# Patient Record
Sex: FEMALE | Race: WHITE | NOT HISPANIC OR LATINO | Employment: OTHER | URBAN - METROPOLITAN AREA
[De-identification: names, ages, dates, MRNs, and addresses within clinical notes are randomized per-mention and may not be internally consistent; named-entity substitution may affect disease eponyms.]

---

## 2023-04-12 LAB
ALANINE AMINOTRANSFERASE (SGPT) (U/L) IN SER/PLAS: 20 U/L (ref 7–45)
ALBUMIN (G/DL) IN SER/PLAS: 4.4 G/DL (ref 3.4–5)
ALKALINE PHOSPHATASE (U/L) IN SER/PLAS: 78 U/L (ref 33–136)
ANION GAP IN SER/PLAS: 11 MMOL/L (ref 10–20)
ASPARTATE AMINOTRANSFERASE (SGOT) (U/L) IN SER/PLAS: 22 U/L (ref 9–39)
BASOPHILS (10*3/UL) IN BLOOD BY AUTOMATED COUNT: 0.08 X10E9/L (ref 0–0.1)
BASOPHILS/100 LEUKOCYTES IN BLOOD BY AUTOMATED COUNT: 1.6 % (ref 0–2)
BILIRUBIN TOTAL (MG/DL) IN SER/PLAS: 0.6 MG/DL (ref 0–1.2)
CALCIDIOL (25 OH VITAMIN D3) (NG/ML) IN SER/PLAS: 44 NG/ML
CALCIUM (MG/DL) IN SER/PLAS: 10 MG/DL (ref 8.6–10.6)
CARBON DIOXIDE, TOTAL (MMOL/L) IN SER/PLAS: 32 MMOL/L (ref 21–32)
CHLORIDE (MMOL/L) IN SER/PLAS: 103 MMOL/L (ref 98–107)
CHOLESTEROL (MG/DL) IN SER/PLAS: 156 MG/DL (ref 0–199)
CHOLESTEROL IN HDL (MG/DL) IN SER/PLAS: 76.7 MG/DL
CHOLESTEROL/HDL RATIO: 2
CREATININE (MG/DL) IN SER/PLAS: 0.8 MG/DL (ref 0.5–1.05)
EOSINOPHILS (10*3/UL) IN BLOOD BY AUTOMATED COUNT: 0.16 X10E9/L (ref 0–0.7)
EOSINOPHILS/100 LEUKOCYTES IN BLOOD BY AUTOMATED COUNT: 3.3 % (ref 0–6)
ERYTHROCYTE DISTRIBUTION WIDTH (RATIO) BY AUTOMATED COUNT: 12.2 % (ref 11.5–14.5)
ERYTHROCYTE MEAN CORPUSCULAR HEMOGLOBIN CONCENTRATION (G/DL) BY AUTOMATED: 34.5 G/DL (ref 32–36)
ERYTHROCYTE MEAN CORPUSCULAR VOLUME (FL) BY AUTOMATED COUNT: 91 FL (ref 80–100)
ERYTHROCYTES (10*6/UL) IN BLOOD BY AUTOMATED COUNT: 4.31 X10E12/L (ref 4–5.2)
ESTIMATED AVERAGE GLUCOSE FOR HBA1C: 114 MG/DL
GFR FEMALE: 79 ML/MIN/1.73M2
GLUCOSE (MG/DL) IN SER/PLAS: 114 MG/DL (ref 74–99)
HEMATOCRIT (%) IN BLOOD BY AUTOMATED COUNT: 39.1 % (ref 36–46)
HEMOGLOBIN (G/DL) IN BLOOD: 13.5 G/DL (ref 12–16)
HEMOGLOBIN A1C/HEMOGLOBIN TOTAL IN BLOOD: 5.6 %
IMMATURE GRANULOCYTES/100 LEUKOCYTES IN BLOOD BY AUTOMATED COUNT: 0.2 % (ref 0–0.9)
LDL: 63 MG/DL (ref 0–99)
LEUKOCYTES (10*3/UL) IN BLOOD BY AUTOMATED COUNT: 4.9 X10E9/L (ref 4.4–11.3)
LYMPHOCYTES (10*3/UL) IN BLOOD BY AUTOMATED COUNT: 1.43 X10E9/L (ref 1.2–4.8)
LYMPHOCYTES/100 LEUKOCYTES IN BLOOD BY AUTOMATED COUNT: 29.3 % (ref 13–44)
MONOCYTES (10*3/UL) IN BLOOD BY AUTOMATED COUNT: 0.35 X10E9/L (ref 0.1–1)
MONOCYTES/100 LEUKOCYTES IN BLOOD BY AUTOMATED COUNT: 7.2 % (ref 2–10)
NEUTROPHILS (10*3/UL) IN BLOOD BY AUTOMATED COUNT: 2.85 X10E9/L (ref 1.2–7.7)
NEUTROPHILS/100 LEUKOCYTES IN BLOOD BY AUTOMATED COUNT: 58.4 % (ref 40–80)
NRBC (PER 100 WBCS) BY AUTOMATED COUNT: 0 /100 WBC (ref 0–0)
PLATELETS (10*3/UL) IN BLOOD AUTOMATED COUNT: 344 X10E9/L (ref 150–450)
POTASSIUM (MMOL/L) IN SER/PLAS: 4.7 MMOL/L (ref 3.5–5.3)
PROTEIN TOTAL: 7.2 G/DL (ref 6.4–8.2)
SODIUM (MMOL/L) IN SER/PLAS: 141 MMOL/L (ref 136–145)
THYROTROPIN (MIU/L) IN SER/PLAS BY DETECTION LIMIT <= 0.05 MIU/L: 2.26 MIU/L (ref 0.44–3.98)
TRIGLYCERIDE (MG/DL) IN SER/PLAS: 81 MG/DL (ref 0–149)
UREA NITROGEN (MG/DL) IN SER/PLAS: 14 MG/DL (ref 6–23)
VLDL: 16 MG/DL (ref 0–40)

## 2023-09-18 PROBLEM — M54.50 LUMBAGO: Status: ACTIVE | Noted: 2023-09-18

## 2023-09-18 PROBLEM — M47.16 LUMBAR SPONDYLOSIS WITH MYELOPATHY: Status: ACTIVE | Noted: 2023-09-18

## 2023-09-18 PROBLEM — M19.042 ARTHRITIS OF LEFT HAND: Status: ACTIVE | Noted: 2023-09-18

## 2023-09-18 PROBLEM — E55.9 VITAMIN D DEFICIENCY: Status: ACTIVE | Noted: 2023-09-18

## 2023-09-18 PROBLEM — R05.3 CHRONIC COUGH: Status: ACTIVE | Noted: 2023-09-18

## 2023-09-18 PROBLEM — R19.4 CHANGE IN BOWEL HABITS: Status: ACTIVE | Noted: 2023-09-18

## 2023-09-18 PROBLEM — R31.29 OTHER MICROSCOPIC HEMATURIA: Status: ACTIVE | Noted: 2023-09-18

## 2023-09-18 PROBLEM — J33.9 NASAL POLYPS: Status: ACTIVE | Noted: 2023-09-18

## 2023-09-18 PROBLEM — R53.83 FATIGUE: Status: ACTIVE | Noted: 2023-09-18

## 2023-09-18 PROBLEM — R51.9 RIGHT TEMPORAL HEADACHE: Status: ACTIVE | Noted: 2023-09-18

## 2023-09-18 PROBLEM — R79.89 ABNORMAL LFTS: Status: ACTIVE | Noted: 2023-09-18

## 2023-09-18 PROBLEM — R73.9 BORDERLINE HYPERGLYCEMIA: Status: ACTIVE | Noted: 2023-09-18

## 2023-09-18 PROBLEM — E66.3 OVERWEIGHT WITH BODY MASS INDEX (BMI) OF 27 TO 27.9 IN ADULT: Status: ACTIVE | Noted: 2023-09-18

## 2023-09-18 PROBLEM — M81.0 AGE RELATED OSTEOPOROSIS: Status: ACTIVE | Noted: 2023-09-18

## 2023-09-18 PROBLEM — M85.80 OSTEOPENIA: Status: ACTIVE | Noted: 2023-09-18

## 2023-09-18 PROBLEM — R42 DIZZINESS: Status: ACTIVE | Noted: 2023-09-18

## 2023-09-18 PROBLEM — M25.561 CHRONIC PAIN OF RIGHT KNEE: Status: ACTIVE | Noted: 2023-09-18

## 2023-09-18 PROBLEM — K21.9 GERD (GASTROESOPHAGEAL REFLUX DISEASE): Status: ACTIVE | Noted: 2023-09-18

## 2023-09-18 PROBLEM — D22.9 NUMEROUS MOLES: Status: ACTIVE | Noted: 2023-09-18

## 2023-09-18 PROBLEM — J30.9 ALLERGIC RHINITIS: Status: ACTIVE | Noted: 2023-09-18

## 2023-09-18 PROBLEM — M54.16 LUMBAR RADICULOPATHY: Status: ACTIVE | Noted: 2023-09-18

## 2023-09-18 PROBLEM — E83.52 HYPERCALCEMIA: Status: ACTIVE | Noted: 2023-09-18

## 2023-09-18 PROBLEM — E78.5 DYSLIPIDEMIA: Status: ACTIVE | Noted: 2023-09-18

## 2023-09-18 PROBLEM — M25.50 ARTHRALGIA OF MULTIPLE SITES: Status: ACTIVE | Noted: 2023-09-18

## 2023-09-18 PROBLEM — R10.31 ABDOMINAL WALL PAIN IN RIGHT LOWER QUADRANT: Status: ACTIVE | Noted: 2023-09-18

## 2023-09-18 PROBLEM — K25.9 GASTRIC ULCER WITHOUT HEMORRHAGE OR PERFORATION: Status: ACTIVE | Noted: 2023-09-18

## 2023-09-18 PROBLEM — M47.817 ARTHRITIS OF LUMBOSACRAL SPINE: Status: ACTIVE | Noted: 2023-09-18

## 2023-09-18 PROBLEM — N32.81 OVERACTIVE BLADDER: Status: ACTIVE | Noted: 2023-09-18

## 2023-09-18 PROBLEM — G89.29 CHRONIC PAIN OF RIGHT KNEE: Status: ACTIVE | Noted: 2023-09-18

## 2023-09-18 PROBLEM — S09.90XA INJURY OF HEAD: Status: ACTIVE | Noted: 2023-09-18

## 2023-09-18 PROBLEM — K76.0 FATTY LIVER: Status: ACTIVE | Noted: 2023-09-18

## 2023-09-18 PROBLEM — R31.9 HEMATURIA: Status: ACTIVE | Noted: 2023-09-18

## 2023-09-18 RX ORDER — LANOLIN ALCOHOL/MO/W.PET/CERES
2 CREAM (GRAM) TOPICAL DAILY
COMMUNITY
Start: 2020-02-21

## 2023-09-18 RX ORDER — CHOLECALCIFEROL (VITAMIN D3) 50 MCG
TABLET ORAL
COMMUNITY
Start: 2019-10-16

## 2023-09-18 RX ORDER — FAMOTIDINE 20 MG/1
1 TABLET, FILM COATED ORAL DAILY
COMMUNITY
Start: 2020-02-21 | End: 2023-10-09

## 2023-09-18 RX ORDER — ROSUVASTATIN CALCIUM 5 MG/1
1 TABLET, COATED ORAL 3 TIMES WEEKLY
COMMUNITY
Start: 2020-02-28 | End: 2023-12-20

## 2023-09-18 RX ORDER — BIOTIN 1 MG
1 TABLET ORAL DAILY
COMMUNITY
Start: 2021-02-24 | End: 2024-04-02

## 2023-09-18 RX ORDER — METFORMIN HYDROCHLORIDE 500 MG/1
1 TABLET, EXTENDED RELEASE ORAL
COMMUNITY
Start: 2018-05-11 | End: 2023-12-20

## 2023-10-08 DIAGNOSIS — K21.9 GASTROESOPHAGEAL REFLUX DISEASE WITHOUT ESOPHAGITIS: Primary | ICD-10-CM

## 2023-10-09 RX ORDER — FAMOTIDINE 20 MG/1
TABLET, FILM COATED ORAL
Qty: 90 TABLET | Refills: 1 | Status: SHIPPED | OUTPATIENT
Start: 2023-10-09 | End: 2024-04-02 | Stop reason: SDUPTHER

## 2023-10-12 ENCOUNTER — OFFICE VISIT (OUTPATIENT)
Dept: PRIMARY CARE | Facility: CLINIC | Age: 68
End: 2023-10-12
Payer: MEDICARE

## 2023-10-12 ENCOUNTER — LAB (OUTPATIENT)
Dept: LAB | Facility: LAB | Age: 68
End: 2023-10-12
Payer: MEDICARE

## 2023-10-12 ENCOUNTER — APPOINTMENT (OUTPATIENT)
Dept: PRIMARY CARE | Facility: CLINIC | Age: 68
End: 2023-10-12
Payer: MEDICARE

## 2023-10-12 VITALS
DIASTOLIC BLOOD PRESSURE: 80 MMHG | SYSTOLIC BLOOD PRESSURE: 108 MMHG | RESPIRATION RATE: 16 BRPM | BODY MASS INDEX: 27.3 KG/M2 | HEIGHT: 63 IN | OXYGEN SATURATION: 96 % | HEART RATE: 61 BPM | WEIGHT: 154.1 LBS

## 2023-10-12 DIAGNOSIS — E78.5 DYSLIPIDEMIA: Primary | ICD-10-CM

## 2023-10-12 DIAGNOSIS — M25.532 WRIST PAIN, CHRONIC, LEFT: ICD-10-CM

## 2023-10-12 DIAGNOSIS — K21.00 GASTROESOPHAGEAL REFLUX DISEASE WITH ESOPHAGITIS WITHOUT HEMORRHAGE: ICD-10-CM

## 2023-10-12 DIAGNOSIS — R73.9 BORDERLINE HYPERGLYCEMIA: ICD-10-CM

## 2023-10-12 DIAGNOSIS — E55.9 VITAMIN D DEFICIENCY: ICD-10-CM

## 2023-10-12 DIAGNOSIS — G89.29 WRIST PAIN, CHRONIC, LEFT: ICD-10-CM

## 2023-10-12 DIAGNOSIS — E78.5 DYSLIPIDEMIA: ICD-10-CM

## 2023-10-12 LAB
CHOLEST SERPL-MCNC: 157 MG/DL (ref 0–199)
CHOLESTEROL/HDL RATIO: 2.2
GLUCOSE P FAST SERPL-MCNC: 128 MG/DL (ref 74–99)
HDLC SERPL-MCNC: 71.2 MG/DL
LDLC SERPL CALC-MCNC: 72 MG/DL
NON HDL CHOLESTEROL: 86 MG/DL (ref 0–149)
TRIGL SERPL-MCNC: 71 MG/DL (ref 0–149)
VLDL: 14 MG/DL (ref 0–40)

## 2023-10-12 PROCEDURE — 82947 ASSAY GLUCOSE BLOOD QUANT: CPT

## 2023-10-12 PROCEDURE — 80061 LIPID PANEL: CPT

## 2023-10-12 PROCEDURE — 36415 COLL VENOUS BLD VENIPUNCTURE: CPT

## 2023-10-12 PROCEDURE — 99214 OFFICE O/P EST MOD 30 MIN: CPT | Performed by: INTERNAL MEDICINE

## 2023-10-12 PROCEDURE — 1159F MED LIST DOCD IN RCRD: CPT | Performed by: INTERNAL MEDICINE

## 2023-10-12 PROCEDURE — 1036F TOBACCO NON-USER: CPT | Performed by: INTERNAL MEDICINE

## 2023-10-12 ASSESSMENT — PATIENT HEALTH QUESTIONNAIRE - PHQ9
SUM OF ALL RESPONSES TO PHQ9 QUESTIONS 1 AND 2: 0
2. FEELING DOWN, DEPRESSED OR HOPELESS: NOT AT ALL
1. LITTLE INTEREST OR PLEASURE IN DOING THINGS: NOT AT ALL

## 2023-10-12 ASSESSMENT — COLUMBIA-SUICIDE SEVERITY RATING SCALE - C-SSRS
2. HAVE YOU ACTUALLY HAD ANY THOUGHTS OF KILLING YOURSELF?: NO
6. HAVE YOU EVER DONE ANYTHING, STARTED TO DO ANYTHING, OR PREPARED TO DO ANYTHING TO END YOUR LIFE?: NO
1. IN THE PAST MONTH, HAVE YOU WISHED YOU WERE DEAD OR WISHED YOU COULD GO TO SLEEP AND NOT WAKE UP?: NO

## 2023-10-12 ASSESSMENT — ENCOUNTER SYMPTOMS
HEADACHES: 0
OCCASIONAL FEELINGS OF UNSTEADINESS: 0
SHORTNESS OF BREATH: 0
ABDOMINAL PAIN: 0
LOSS OF SENSATION IN FEET: 0
DEPRESSION: 0

## 2023-10-12 NOTE — PATIENT INSTRUCTIONS
Reduce snacks at night, reduce weights and heavy lifting, continue brace for few weeks, apply  3 x day voltaren topical otc (diclofenac), ice periodically,  if not improved in 1 m, see hand ortho, return here in aprilease reduce snacks after dinner, use ligher weights and avoid heavy lifting. Return here for physical

## 2023-10-12 NOTE — PROGRESS NOTES
"Subjective   Patient ID: Janis Bailey is a 68 y.o. female who presents for  cholesterol . And borderline sugar    HPI She admits more relax diet during vacation, she eats some sweets after dinner. She was gong to gym with [employer], 4 x week, weights , cardio. However 6 days ago after exercise class and working in yard notice tender hand and wrist. She also felt in mid August with minor injury of same left wrist. She started since Friday wearing wrist, applied ice, and took oral 400 ibuprofen  qday  Dizzziness that she had before traveling in August has resolved.  Sleeps 5 to 6 hours and feels need more    Review of Systems   Respiratory:  Negative for shortness of breath.    Cardiovascular:  Negative for chest pain.   Gastrointestinal:  Negative for abdominal pain.   Neurological:  Negative for headaches.       Objective   /80 (BP Location: Left arm, Patient Position: Sitting, BP Cuff Size: Adult)   Pulse 61   Resp 16   Ht 1.6 m (5' 3\")   Wt 69.9 kg (154 lb 1.6 oz)   SpO2 96%   BMI 27.30 kg/m²     Physical Exam  Eyes - conjunctivae clear, PERRLA  HEENT - no impacted wax  Neck - no cervical lymphadenopathy, no thyromegaly  Axilla - no palpable lymphadenopathy  Cardiac- regular rate and rhythm, no murmurs, no carotid bruit, no JVP  Lung - clear to auscultation, no rales, no rhonchi, no wheezing  GI - normally active bowel sounds, non tender, non distended, no hepatosplenomegaly, no rebound  MSK - non deformities, edema on left thumb and 5 finger, tenderness to hyperextension of left wrist, no bruise, otherwise rom preserved  Extremities - no edema, good distal pulses  Neuro - non focal, oriented x 3  Skin - no bruises, no rashes  Psychiatric - pleasant, well groom, no hallucinations    Assessment/Plan   Problem List Items Addressed This Visit             ICD-10-CM       Cardiac and Vasculature    Dyslipidemia - Primary E78.5    Relevant Orders    Lipid Panel    Follow Up In Primary Care - " Health Maintenance       Endocrine/Metabolic    Borderline hyperglycemia R73.9    Relevant Orders    Glucose, fasting    Follow Up In Primary Care - Health Maintenance    Vitamin D deficiency E55.9     Discussed fasting hyperglycemia, diet modifications            Gastrointestinal and Abdominal    GERD (gastroesophageal reflux disease) K21.9    Relevant Orders    Follow Up In Primary Care - Health Maintenance       Musculoskeletal and Injuries    Wrist pain, chronic, left M25.532, G89.29

## 2023-10-13 ENCOUNTER — TELEPHONE (OUTPATIENT)
Dept: PRIMARY CARE | Facility: CLINIC | Age: 68
End: 2023-10-13
Payer: MEDICARE

## 2023-10-18 ENCOUNTER — APPOINTMENT (OUTPATIENT)
Dept: PRIMARY CARE | Facility: CLINIC | Age: 68
End: 2023-10-18
Payer: MEDICARE

## 2023-12-18 DIAGNOSIS — E78.5 DYSLIPIDEMIA: ICD-10-CM

## 2023-12-18 DIAGNOSIS — E11.9 DIABETES MELLITUS WITHOUT COMPLICATION (MULTI): ICD-10-CM

## 2023-12-20 RX ORDER — METFORMIN HYDROCHLORIDE 500 MG/1
500 TABLET, EXTENDED RELEASE ORAL
Qty: 180 TABLET | Refills: 1 | Status: SHIPPED | OUTPATIENT
Start: 2023-12-20 | End: 2024-03-18 | Stop reason: SDUPTHER

## 2023-12-20 RX ORDER — ROSUVASTATIN CALCIUM 5 MG/1
TABLET, COATED ORAL
Qty: 72 TABLET | Refills: 1 | Status: SHIPPED | OUTPATIENT
Start: 2023-12-20

## 2024-03-18 DIAGNOSIS — E11.9 DIABETES MELLITUS WITHOUT COMPLICATION (MULTI): ICD-10-CM

## 2024-03-18 RX ORDER — METFORMIN HYDROCHLORIDE 500 MG/1
500 TABLET, EXTENDED RELEASE ORAL
Qty: 180 TABLET | Refills: 1 | Status: SHIPPED | OUTPATIENT
Start: 2024-03-18

## 2024-04-02 ENCOUNTER — OFFICE VISIT (OUTPATIENT)
Dept: PRIMARY CARE | Facility: CLINIC | Age: 69
End: 2024-04-02
Payer: MEDICARE

## 2024-04-02 ENCOUNTER — LAB (OUTPATIENT)
Dept: LAB | Facility: LAB | Age: 69
End: 2024-04-02
Payer: MEDICARE

## 2024-04-02 VITALS
SYSTOLIC BLOOD PRESSURE: 118 MMHG | WEIGHT: 153.6 LBS | BODY MASS INDEX: 28.26 KG/M2 | DIASTOLIC BLOOD PRESSURE: 70 MMHG | RESPIRATION RATE: 15 BRPM | HEART RATE: 67 BPM | HEIGHT: 62 IN | OXYGEN SATURATION: 99 % | TEMPERATURE: 97.5 F

## 2024-04-02 DIAGNOSIS — E55.9 VITAMIN D DEFICIENCY, UNSPECIFIED: ICD-10-CM

## 2024-04-02 DIAGNOSIS — E78.5 DYSLIPIDEMIA: ICD-10-CM

## 2024-04-02 DIAGNOSIS — N32.81 OVERACTIVE BLADDER: ICD-10-CM

## 2024-04-02 DIAGNOSIS — Z86.39 HISTORY OF HYPERGLYCEMIA: ICD-10-CM

## 2024-04-02 DIAGNOSIS — R73.9 BORDERLINE HYPERGLYCEMIA: ICD-10-CM

## 2024-04-02 DIAGNOSIS — K21.9 GASTROESOPHAGEAL REFLUX DISEASE WITHOUT ESOPHAGITIS: ICD-10-CM

## 2024-04-02 DIAGNOSIS — M81.0 AGE RELATED OSTEOPOROSIS, UNSPECIFIED PATHOLOGICAL FRACTURE PRESENCE: ICD-10-CM

## 2024-04-02 DIAGNOSIS — Z00.00 PHYSICAL EXAM, ANNUAL: ICD-10-CM

## 2024-04-02 DIAGNOSIS — Z12.31 SCREENING MAMMOGRAM FOR BREAST CANCER: ICD-10-CM

## 2024-04-02 DIAGNOSIS — Z00.00 ROUTINE GENERAL MEDICAL EXAMINATION AT HEALTH CARE FACILITY: ICD-10-CM

## 2024-04-02 DIAGNOSIS — Z00.00 WELLNESS EXAMINATION: Primary | ICD-10-CM

## 2024-04-02 PROBLEM — R10.31 ABDOMINAL WALL PAIN IN RIGHT LOWER QUADRANT: Status: RESOLVED | Noted: 2023-09-18 | Resolved: 2024-04-02

## 2024-04-02 PROBLEM — M47.16 LUMBAR SPONDYLOSIS WITH MYELOPATHY: Status: RESOLVED | Noted: 2023-09-18 | Resolved: 2024-04-02

## 2024-04-02 PROBLEM — S09.90XA INJURY OF HEAD: Status: RESOLVED | Noted: 2023-09-18 | Resolved: 2024-04-02

## 2024-04-02 PROBLEM — M54.16 LUMBAR RADICULOPATHY: Status: RESOLVED | Noted: 2023-09-18 | Resolved: 2024-04-02

## 2024-04-02 PROBLEM — R19.4 CHANGE IN BOWEL HABITS: Status: RESOLVED | Noted: 2023-09-18 | Resolved: 2024-04-02

## 2024-04-02 LAB
25(OH)D3 SERPL-MCNC: 47 NG/ML (ref 30–100)
ALBUMIN SERPL BCP-MCNC: 4.3 G/DL (ref 3.4–5)
ALP SERPL-CCNC: 82 U/L (ref 33–136)
ALT SERPL W P-5'-P-CCNC: 20 U/L (ref 7–45)
ANION GAP SERPL CALC-SCNC: 12 MMOL/L (ref 10–20)
AST SERPL W P-5'-P-CCNC: 21 U/L (ref 9–39)
BILIRUB SERPL-MCNC: 0.5 MG/DL (ref 0–1.2)
BUN SERPL-MCNC: 19 MG/DL (ref 6–23)
CALCIUM SERPL-MCNC: 9.8 MG/DL (ref 8.6–10.6)
CHLORIDE SERPL-SCNC: 103 MMOL/L (ref 98–107)
CHOLEST SERPL-MCNC: 171 MG/DL (ref 0–199)
CHOLESTEROL/HDL RATIO: 2.3
CO2 SERPL-SCNC: 28 MMOL/L (ref 21–32)
CREAT SERPL-MCNC: 0.78 MG/DL (ref 0.5–1.05)
EGFRCR SERPLBLD CKD-EPI 2021: 81 ML/MIN/1.73M*2
ERYTHROCYTE [DISTWIDTH] IN BLOOD BY AUTOMATED COUNT: 13.5 % (ref 11.5–14.5)
EST. AVERAGE GLUCOSE BLD GHB EST-MCNC: 120 MG/DL
GLUCOSE SERPL-MCNC: 122 MG/DL (ref 74–99)
HBA1C MFR BLD: 5.8 %
HCT VFR BLD AUTO: 37.4 % (ref 36–46)
HDLC SERPL-MCNC: 75.2 MG/DL
HGB BLD-MCNC: 11.6 G/DL (ref 12–16)
LDLC SERPL CALC-MCNC: 80 MG/DL
MCH RBC QN AUTO: 27.8 PG (ref 26–34)
MCHC RBC AUTO-ENTMCNC: 31 G/DL (ref 32–36)
MCV RBC AUTO: 90 FL (ref 80–100)
NON HDL CHOLESTEROL: 96 MG/DL (ref 0–149)
NRBC BLD-RTO: 0 /100 WBCS (ref 0–0)
PLATELET # BLD AUTO: 368 X10*3/UL (ref 150–450)
POTASSIUM SERPL-SCNC: 4.6 MMOL/L (ref 3.5–5.3)
PROT SERPL-MCNC: 7.2 G/DL (ref 6.4–8.2)
RBC # BLD AUTO: 4.18 X10*6/UL (ref 4–5.2)
SODIUM SERPL-SCNC: 138 MMOL/L (ref 136–145)
TRIGL SERPL-MCNC: 78 MG/DL (ref 0–149)
TSH SERPL-ACNC: 3.96 MIU/L (ref 0.44–3.98)
VLDL: 16 MG/DL (ref 0–40)
WBC # BLD AUTO: 4.1 X10*3/UL (ref 4.4–11.3)

## 2024-04-02 PROCEDURE — 1159F MED LIST DOCD IN RCRD: CPT | Performed by: INTERNAL MEDICINE

## 2024-04-02 PROCEDURE — 83036 HEMOGLOBIN GLYCOSYLATED A1C: CPT

## 2024-04-02 PROCEDURE — 85027 COMPLETE CBC AUTOMATED: CPT

## 2024-04-02 PROCEDURE — 99397 PER PM REEVAL EST PAT 65+ YR: CPT | Performed by: INTERNAL MEDICINE

## 2024-04-02 PROCEDURE — 80061 LIPID PANEL: CPT

## 2024-04-02 PROCEDURE — 1160F RVW MEDS BY RX/DR IN RCRD: CPT | Performed by: INTERNAL MEDICINE

## 2024-04-02 PROCEDURE — 84443 ASSAY THYROID STIM HORMONE: CPT

## 2024-04-02 PROCEDURE — 80053 COMPREHEN METABOLIC PANEL: CPT

## 2024-04-02 PROCEDURE — 36415 COLL VENOUS BLD VENIPUNCTURE: CPT

## 2024-04-02 PROCEDURE — 1170F FXNL STATUS ASSESSED: CPT | Performed by: INTERNAL MEDICINE

## 2024-04-02 PROCEDURE — 1036F TOBACCO NON-USER: CPT | Performed by: INTERNAL MEDICINE

## 2024-04-02 PROCEDURE — 82306 VITAMIN D 25 HYDROXY: CPT

## 2024-04-02 PROCEDURE — G0439 PPPS, SUBSEQ VISIT: HCPCS | Performed by: INTERNAL MEDICINE

## 2024-04-02 RX ORDER — FAMOTIDINE 20 MG/1
TABLET, FILM COATED ORAL
Qty: 90 TABLET | Refills: 1 | Status: SHIPPED | OUTPATIENT
Start: 2024-04-02

## 2024-04-02 ASSESSMENT — ACTIVITIES OF DAILY LIVING (ADL)
TAKING_MEDICATION: INDEPENDENT
MANAGING_FINANCES: INDEPENDENT
GROCERY_SHOPPING: INDEPENDENT
DOING_HOUSEWORK: INDEPENDENT
DRESSING: INDEPENDENT
BATHING: INDEPENDENT

## 2024-04-02 ASSESSMENT — ENCOUNTER SYMPTOMS
HEADACHES: 0
NERVOUS/ANXIOUS: 0
CHEST TIGHTNESS: 0
SHORTNESS OF BREATH: 0
ABDOMINAL PAIN: 0
OCCASIONAL FEELINGS OF UNSTEADINESS: 0
CONSTIPATION: 0
DEPRESSION: 0
LIGHT-HEADEDNESS: 0
TREMORS: 0
BLOOD IN STOOL: 0
LOSS OF SENSATION IN FEET: 0

## 2024-04-02 ASSESSMENT — PATIENT HEALTH QUESTIONNAIRE - PHQ9
1. LITTLE INTEREST OR PLEASURE IN DOING THINGS: NOT AT ALL
2. FEELING DOWN, DEPRESSED OR HOPELESS: NOT AT ALL
SUM OF ALL RESPONSES TO PHQ9 QUESTIONS 1 AND 2: 0

## 2024-04-02 NOTE — PATIENT INSTRUCTIONS
Reduce snacks , maintain exercise, consider RSV vaccine, continue influenza booster every fall. Complete mammogram also in October. See dermatologist. Continue eye and dental visits as well. Return in 4m

## 2024-04-02 NOTE — PROGRESS NOTES
"Subjective   Reason for Visit: Janis Bailey is an 69 y.o. female here for a Medicare Wellness visit.     Past Medical, Surgical, and Family History reviewed and updated in chart.Reviewed all medications by prescribing practitioner or clinical pharmacist (such as prescriptions, OTCs, herbal therapies and supplements) and documented in the medical record.    HPI    Patient Care Team:  Ana Esquivel MD as PCP - General  Ana Esquivel MD as PCP - [employer] PCP   She eats 3 meals, average servings of fresh produce 2 x day, any kind of protein, downfall evening snack savory nuts or sweets.   She continues doing exercise at [facility], 3 x week, with weight straingh, cardio , yoga, each 45 min  Heartburn control with meds.  Continue taking rosuvastatin 5 x sweek    Review of Systems   Respiratory:  Negative for chest tightness and shortness of breath.    Cardiovascular:  Negative for leg swelling.   Gastrointestinal:  Negative for abdominal pain, blood in stool and constipation.   Genitourinary:         Urinary urgency , but prefers to avoid meds.   Musculoskeletal:         Right knee ache that relieves after activity. Ocassionally left groin/hip pain after exercise   Neurological:  Negative for tremors, light-headedness and headaches.   Psychiatric/Behavioral:  The patient is not nervous/anxious.         Sleeps about 6 hours , gets up to void or let dog out, some times able to fall sleep again   All other systems reviewed and are negative.      Objective   Vitals:  /70 (BP Location: Left arm, Patient Position: Sitting, BP Cuff Size: Adult)   Pulse 67   Temp 36.4 °C (97.5 °F)   Resp 15   Ht 1.575 m (5' 2\")   Wt 69.7 kg (153 lb 9.6 oz)   SpO2 99%   BMI 28.09 kg/m²       Physical Exam  Eyes - conjunctiva clear, PERRLA  HEENT - no impacted wax  Neck - No cervical lymphadenopathy, no thyromegaly  Axilla - no palpable lymphadenopathy  Breast - visual exam: no asymmetry; palpation: " nontender, no palpable nodules, retractions or discharge bilaterally  Cardiac - regular rate and rhythm, no murmurs, no carotid bruit, no JVP  Lung- clear to auscultation, no rales, no rhonchi, no wheezing  GI- normally active bowel sounds, nontender, nondistended, no hepatosplenomegaly, no rebound  MSK - no deformities  Neuro - non focal, oriented x 3  Extremities - no edema, good distal arterial pulses  Skin - no rash, few moles,   Psychiatric - pleasant, cooperative, no hallucinations    Assessment/Plan   Problem List Items Addressed This Visit       Borderline hyperglycemia    Current Assessment & Plan     Continue metformin         Relevant Orders    Hemoglobin A1C    Dyslipidemia    Overview     Lipid pending         Relevant Orders    Comprehensive Metabolic Panel    Lipid Panel    GERD (gastroesophageal reflux disease)    Current Assessment & Plan     Stable on meds         Relevant Medications    famotidine (Pepcid) 20 mg tablet    Other Relevant Orders    CBC    Age related osteoporosis    Relevant Orders    TSH with reflex to Free T4 if abnormal    Overactive bladder    Current Assessment & Plan     Pt prefers no medical intervention at present time         Wellness examination - Primary    Current Assessment & Plan     Discussed diet, exercise, immunizations, and screening appropriate for their age.  Colonoscopy: 2021 next 2026  Dexa: April 2022 borderline osteopenia  Mammogram: oct 2023             Physical exam, annual     Other Visit Diagnoses       Screening mammogram for breast cancer        Relevant Orders    BI mammo bilateral screening tomosynthesis    Vitamin D deficiency, unspecified        Relevant Orders    Vitamin D 25-Hydroxy,Total (for eval of Vitamin D levels)    History of hyperglycemia        Relevant Orders    TSH with reflex to Free T4 if abnormal

## 2024-04-02 NOTE — ASSESSMENT & PLAN NOTE
Discussed diet, exercise, immunizations, and screening appropriate for their age.  Colonoscopy: 2021 next 2026  Dexa: April 2022 borderline osteopenia  Mammogram: oct 2023

## 2024-04-04 ENCOUNTER — TELEPHONE (OUTPATIENT)
Dept: PRIMARY CARE | Facility: CLINIC | Age: 69
End: 2024-04-04
Payer: MEDICARE

## 2024-04-04 NOTE — TELEPHONE ENCOUNTER
Spoke with pt and all concerns were addressed                     ----- Message from Marci Welch CMA sent at 4/4/2024  3:01 PM EDT -----    ----- Message -----  From: Tomy Sam MD  Sent: 4/4/2024  12:10 PM EDT  To: Marci Welch CMA    Call patient I looked her labs  Glucose remains a little bit high at 122 A1c is still low at 5.8 continue with low-carb diet gradual weight loss

## 2024-07-10 ENCOUNTER — LAB (OUTPATIENT)
Dept: LAB | Facility: LAB | Age: 69
End: 2024-07-10
Payer: MEDICARE

## 2024-07-10 ENCOUNTER — OFFICE VISIT (OUTPATIENT)
Dept: PRIMARY CARE | Facility: CLINIC | Age: 69
End: 2024-07-10
Payer: MEDICARE

## 2024-07-10 VITALS
HEIGHT: 62 IN | SYSTOLIC BLOOD PRESSURE: 135 MMHG | WEIGHT: 152.12 LBS | HEART RATE: 62 BPM | RESPIRATION RATE: 16 BRPM | OXYGEN SATURATION: 98 % | DIASTOLIC BLOOD PRESSURE: 78 MMHG | TEMPERATURE: 97.2 F | BODY MASS INDEX: 27.99 KG/M2

## 2024-07-10 DIAGNOSIS — R73.9 BORDERLINE HYPERGLYCEMIA: Primary | ICD-10-CM

## 2024-07-10 DIAGNOSIS — D64.9 ANEMIA, UNSPECIFIED TYPE: ICD-10-CM

## 2024-07-10 DIAGNOSIS — R73.9 BORDERLINE HYPERGLYCEMIA: ICD-10-CM

## 2024-07-10 DIAGNOSIS — E78.5 DYSLIPIDEMIA: ICD-10-CM

## 2024-07-10 DIAGNOSIS — R03.0 PREHYPERTENSION: ICD-10-CM

## 2024-07-10 LAB
ERYTHROCYTE [DISTWIDTH] IN BLOOD BY AUTOMATED COUNT: 12.9 % (ref 11.5–14.5)
GLUCOSE P FAST SERPL-MCNC: 117 MG/DL (ref 74–99)
HCT VFR BLD AUTO: 32.1 % (ref 36–46)
HGB BLD-MCNC: 10.4 G/DL (ref 12–16)
IRON SATN MFR SERPL: 13 % (ref 25–45)
IRON SERPL-MCNC: 62 UG/DL (ref 35–150)
MCH RBC QN AUTO: 27.9 PG (ref 26–34)
MCHC RBC AUTO-ENTMCNC: 32.4 G/DL (ref 32–36)
MCV RBC AUTO: 86 FL (ref 80–100)
NRBC BLD-RTO: 0 /100 WBCS (ref 0–0)
PLATELET # BLD AUTO: 359 X10*3/UL (ref 150–450)
RBC # BLD AUTO: 3.73 X10*6/UL (ref 4–5.2)
TIBC SERPL-MCNC: 470 UG/DL (ref 240–445)
UIBC SERPL-MCNC: 408 UG/DL (ref 110–370)
WBC # BLD AUTO: 4.8 X10*3/UL (ref 4.4–11.3)

## 2024-07-10 PROCEDURE — 1160F RVW MEDS BY RX/DR IN RCRD: CPT | Performed by: INTERNAL MEDICINE

## 2024-07-10 PROCEDURE — 83550 IRON BINDING TEST: CPT

## 2024-07-10 PROCEDURE — 85027 COMPLETE CBC AUTOMATED: CPT

## 2024-07-10 PROCEDURE — 36415 COLL VENOUS BLD VENIPUNCTURE: CPT

## 2024-07-10 PROCEDURE — 99214 OFFICE O/P EST MOD 30 MIN: CPT | Performed by: INTERNAL MEDICINE

## 2024-07-10 PROCEDURE — 1036F TOBACCO NON-USER: CPT | Performed by: INTERNAL MEDICINE

## 2024-07-10 PROCEDURE — 1159F MED LIST DOCD IN RCRD: CPT | Performed by: INTERNAL MEDICINE

## 2024-07-10 PROCEDURE — 83540 ASSAY OF IRON: CPT

## 2024-07-10 PROCEDURE — 82947 ASSAY GLUCOSE BLOOD QUANT: CPT

## 2024-07-10 RX ORDER — ROSUVASTATIN CALCIUM 5 MG/1
TABLET, COATED ORAL
Qty: 72 TABLET | Refills: 1 | Status: SHIPPED | OUTPATIENT
Start: 2024-07-10

## 2024-07-10 ASSESSMENT — COLUMBIA-SUICIDE SEVERITY RATING SCALE - C-SSRS
1. IN THE PAST MONTH, HAVE YOU WISHED YOU WERE DEAD OR WISHED YOU COULD GO TO SLEEP AND NOT WAKE UP?: NO
2. HAVE YOU ACTUALLY HAD ANY THOUGHTS OF KILLING YOURSELF?: NO
6. HAVE YOU EVER DONE ANYTHING, STARTED TO DO ANYTHING, OR PREPARED TO DO ANYTHING TO END YOUR LIFE?: NO

## 2024-07-10 ASSESSMENT — ENCOUNTER SYMPTOMS
OCCASIONAL FEELINGS OF UNSTEADINESS: 0
DEPRESSION: 0
LOSS OF SENSATION IN FEET: 0

## 2024-07-10 NOTE — PATIENT INSTRUCTIONS
Decrease salty snacks, increase protein and vegetables in your dinner, avoid eating after 7:30 pm, continue current medications, with blood work will advice further about iron and donations. In the fall no at the same time complete influenza and RSV vaccines  Return in 4 months.   spontaneous

## 2024-07-10 NOTE — ASSESSMENT & PLAN NOTE
Due to frequent donations, w [employer] will advice if appropiate iron and frequency of donations

## 2024-07-10 NOTE — PROGRESS NOTES
"Subjective   Patient ID: Janis Bailey is a 69 y.o. female who presents for Hyperlipidemia.    HPI She continues doing  yoga, swimming, cardio/weights and ocassionally walks her dog. Last few weeks craving for salty snacks. Snacks some times after dinner if very early.   She donates blood about 3 x year, she donated in June and experienced important fatigue for several weeks in that month. She takes iron otc for couple days prior to donation.    Review of Systems   Constitutional:         Ocassional early afternoon tiredness and ocassionally naps.   Gastrointestinal:         Intermitent abdominal pain that subside after bowel movement. Heartburn asymptomatic.        Objective   /78   Pulse 62   Temp 36.2 °C (97.2 °F)   Resp 16   Ht 1.575 m (5' 2\")   Wt 69 kg (152 lb 1.9 oz)   SpO2 98%   BMI 27.82 kg/m²     Physical Exam    Assessment/Plan   Problem List Items Addressed This Visit             ICD-10-CM    Borderline hyperglycemia - Primary R73.9     Discussed her diet and maintenance of exercise.         Relevant Orders    Glucose, fasting    Dyslipidemia E78.5     Continue same dose, refills done         Relevant Medications    rosuvastatin (Crestor) 5 mg tablet    Anemia D64.9     Due to frequent donations, w new labs will advice if appropiate iron and frequency of donations         Relevant Orders    CBC    Iron and TIBC    Prehypertension R03.0     Discussed salt intake, hydration, maintain exercise. Ocassional bp monitor outside of office. Return in 4 m to recheck               "

## 2024-07-10 NOTE — ASSESSMENT & PLAN NOTE
Discussed salt intake, hydration, maintain exercise. Ocassional bp monitor outside of office. Return in 4 m to recheck

## 2024-07-16 DIAGNOSIS — D50.8 OTHER IRON DEFICIENCY ANEMIA: Primary | ICD-10-CM

## 2024-07-16 RX ORDER — FERROUS GLUCONATE 256(28)MG
TABLET ORAL
Qty: 30 TABLET | Refills: 3 | Status: SHIPPED | OUTPATIENT
Start: 2024-07-16

## 2024-08-13 ENCOUNTER — APPOINTMENT (OUTPATIENT)
Dept: PRIMARY CARE | Facility: CLINIC | Age: 69
End: 2024-08-13
Payer: MEDICARE

## 2024-08-16 ENCOUNTER — APPOINTMENT (OUTPATIENT)
Dept: PRIMARY CARE | Facility: CLINIC | Age: 69
End: 2024-08-16
Payer: MEDICARE

## 2024-09-10 DIAGNOSIS — E11.9 DIABETES MELLITUS WITHOUT COMPLICATION (MULTI): ICD-10-CM

## 2024-09-11 RX ORDER — METFORMIN HYDROCHLORIDE 500 MG/1
500 TABLET, EXTENDED RELEASE ORAL
Qty: 180 TABLET | Refills: 1 | Status: SHIPPED | OUTPATIENT
Start: 2024-09-11

## 2024-10-18 DIAGNOSIS — K21.9 GASTROESOPHAGEAL REFLUX DISEASE WITHOUT ESOPHAGITIS: ICD-10-CM

## 2024-10-18 RX ORDER — FAMOTIDINE 20 MG/1
TABLET, FILM COATED ORAL
Qty: 90 TABLET | Refills: 1 | Status: SHIPPED | OUTPATIENT
Start: 2024-10-18

## 2024-11-05 ENCOUNTER — APPOINTMENT (OUTPATIENT)
Dept: PRIMARY CARE | Facility: CLINIC | Age: 69
End: 2024-11-05
Payer: MEDICARE

## 2024-11-21 ENCOUNTER — APPOINTMENT (OUTPATIENT)
Dept: PRIMARY CARE | Facility: CLINIC | Age: 69
End: 2024-11-21
Payer: MEDICARE

## 2024-11-21 ENCOUNTER — LAB (OUTPATIENT)
Dept: LAB | Facility: LAB | Age: 69
End: 2024-11-21
Payer: MEDICARE

## 2024-11-21 VITALS
DIASTOLIC BLOOD PRESSURE: 71 MMHG | SYSTOLIC BLOOD PRESSURE: 135 MMHG | WEIGHT: 152 LBS | BODY MASS INDEX: 27.8 KG/M2 | OXYGEN SATURATION: 96 % | HEART RATE: 62 BPM

## 2024-11-21 DIAGNOSIS — E78.5 DYSLIPIDEMIA: ICD-10-CM

## 2024-11-21 DIAGNOSIS — D64.9 ANEMIA, UNSPECIFIED TYPE: ICD-10-CM

## 2024-11-21 DIAGNOSIS — D64.9 ANEMIA, UNSPECIFIED TYPE: Primary | ICD-10-CM

## 2024-11-21 DIAGNOSIS — R73.9 BORDERLINE HYPERGLYCEMIA: ICD-10-CM

## 2024-11-21 DIAGNOSIS — R03.0 ELEVATED BLOOD PRESSURE READING: ICD-10-CM

## 2024-11-21 DIAGNOSIS — R25.2 LEG CRAMP: ICD-10-CM

## 2024-11-21 LAB
ERYTHROCYTE [DISTWIDTH] IN BLOOD BY AUTOMATED COUNT: 12.7 % (ref 11.5–14.5)
GLUCOSE P FAST SERPL-MCNC: 110 MG/DL (ref 74–99)
HCT VFR BLD AUTO: 39 % (ref 36–46)
HGB BLD-MCNC: 13.4 G/DL (ref 12–16)
IRON SATN MFR SERPL: 32 % (ref 25–45)
IRON SERPL-MCNC: 127 UG/DL (ref 35–150)
MAGNESIUM SERPL-MCNC: 2.01 MG/DL (ref 1.6–2.4)
MCH RBC QN AUTO: 31.3 PG (ref 26–34)
MCHC RBC AUTO-ENTMCNC: 34.4 G/DL (ref 32–36)
MCV RBC AUTO: 91 FL (ref 80–100)
NRBC BLD-RTO: 0 /100 WBCS (ref 0–0)
PLATELET # BLD AUTO: 378 X10*3/UL (ref 150–450)
RBC # BLD AUTO: 4.28 X10*6/UL (ref 4–5.2)
TIBC SERPL-MCNC: 393 UG/DL (ref 240–445)
UIBC SERPL-MCNC: 266 UG/DL (ref 110–370)
VIT B12 SERPL-MCNC: >2000 PG/ML (ref 211–911)
WBC # BLD AUTO: 5.4 X10*3/UL (ref 4.4–11.3)

## 2024-11-21 PROCEDURE — 83735 ASSAY OF MAGNESIUM: CPT

## 2024-11-21 PROCEDURE — 1158F ADVNC CARE PLAN TLK DOCD: CPT | Performed by: INTERNAL MEDICINE

## 2024-11-21 PROCEDURE — 82947 ASSAY GLUCOSE BLOOD QUANT: CPT

## 2024-11-21 PROCEDURE — 82607 VITAMIN B-12: CPT

## 2024-11-21 PROCEDURE — 1036F TOBACCO NON-USER: CPT | Performed by: INTERNAL MEDICINE

## 2024-11-21 PROCEDURE — 1159F MED LIST DOCD IN RCRD: CPT | Performed by: INTERNAL MEDICINE

## 2024-11-21 PROCEDURE — 36415 COLL VENOUS BLD VENIPUNCTURE: CPT

## 2024-11-21 PROCEDURE — 83550 IRON BINDING TEST: CPT

## 2024-11-21 PROCEDURE — 85027 COMPLETE CBC AUTOMATED: CPT

## 2024-11-21 PROCEDURE — 1123F ACP DISCUSS/DSCN MKR DOCD: CPT | Performed by: INTERNAL MEDICINE

## 2024-11-21 PROCEDURE — 83540 ASSAY OF IRON: CPT

## 2024-11-21 PROCEDURE — 99214 OFFICE O/P EST MOD 30 MIN: CPT | Performed by: INTERNAL MEDICINE

## 2024-11-21 PROCEDURE — G2211 COMPLEX E/M VISIT ADD ON: HCPCS | Performed by: INTERNAL MEDICINE

## 2024-11-21 ASSESSMENT — ENCOUNTER SYMPTOMS
LOSS OF SENSATION IN FEET: 0
DEPRESSION: 0
FATIGUE: 0
OCCASIONAL FEELINGS OF UNSTEADINESS: 0
PALPITATIONS: 0

## 2024-11-21 NOTE — PROGRESS NOTES
Subjective   Patient ID: Janis Bailey is a 69 y.o. female who presents for Follow-up.bp, anemia,     HPI Her salt intake is moderate to high, her intake of calories are stable, she continues exercise , today her initial bp was 170 but she was running errands, prior reading have shawna mild elevated.  She started in July OTC iron, after labs in our office and she has not donated any more. She feels less tired.     Review of Systems   Constitutional:  Negative for fatigue.   Cardiovascular:  Negative for palpitations and leg swelling.   Musculoskeletal:         Bilateral knee aches.leg cramps at night sporadically.   Neurological:         Weather related sinus headaches   All other systems reviewed and are negative.      Objective   /84 (BP Location: Right arm, Patient Position: Sitting)   Pulse 62   Wt 68.9 kg (152 lb)   SpO2 96%   BMI 27.80 kg/m²     Physical Exam  Eyes - conjunctivae clear, PERRLA  HEENT - no impacted wax  Neck - no cervical lymphadenopathy, no thyromegaly  Axilla - no palpable lymphadenopathy  Cardiac- regular rate and rhythm, no murmurs, no carotid bruit, no JVP  Lung - clear to auscultation, no rales, no rhonchi, no wheezing  GI - normally active bowel sounds, non tender, non distended, no hepatosplenomegaly, no rebound  MSK - non deformities  Extremities - no edema, good distal pulses  Neuro - non focal, oriented x 3  Skin - no bruises, no rashes  Psychiatric - pleasant, well groom, no hallucinations    Assessment/Plan   Problem List Items Addressed This Visit             ICD-10-CM    Borderline hyperglycemia R73.9     Continue metformin         Relevant Orders    Glucose, fasting    Dyslipidemia E78.5     On goal         Anemia - Primary D64.9     Pending new labs. Pt clinically improved         Relevant Orders    CBC    Iron and TIBC    Vitamin B12    Elevated blood pressure reading R03.0     Discussed non medical interventions for bp reduction , home monitor, re eval in 6 to 8  w         Leg cramp R25.2     Discussed streching, hydration , we may add magnsium if labs ok         Relevant Orders    Magnesium

## 2024-11-21 NOTE — PATIENT INSTRUCTIONS
Keep low salt intake, maintain your weight , obtain your own blood pressure machine and monitor about 4 x week, call office if top number 140 or bottom 90 frequently. With labs will give you more instructions. Advised to complete covid.

## 2024-11-25 ENCOUNTER — OFFICE VISIT (OUTPATIENT)
Dept: URGENT CARE | Age: 69
End: 2024-11-25
Payer: MEDICARE

## 2024-11-25 VITALS
WEIGHT: 151 LBS | DIASTOLIC BLOOD PRESSURE: 74 MMHG | OXYGEN SATURATION: 98 % | TEMPERATURE: 97.6 F | HEIGHT: 63 IN | SYSTOLIC BLOOD PRESSURE: 140 MMHG | BODY MASS INDEX: 26.75 KG/M2 | RESPIRATION RATE: 22 BRPM | HEART RATE: 60 BPM

## 2024-11-25 DIAGNOSIS — R30.0 DYSURIA: Primary | ICD-10-CM

## 2024-11-25 LAB
POC APPEARANCE, URINE: ABNORMAL
POC BILIRUBIN, URINE: NEGATIVE
POC BLOOD, URINE: ABNORMAL
POC COLOR, URINE: ABNORMAL
POC GLUCOSE, URINE: NEGATIVE MG/DL
POC KETONES, URINE: NEGATIVE MG/DL
POC LEUKOCYTES, URINE: ABNORMAL
POC NITRITE,URINE: NEGATIVE
POC PH, URINE: 5.5 PH
POC PROTEIN, URINE: NEGATIVE MG/DL
POC SPECIFIC GRAVITY, URINE: 1.02
POC UROBILINOGEN, URINE: 0.2 EU/DL

## 2024-11-25 PROCEDURE — 81003 URINALYSIS AUTO W/O SCOPE: CPT | Performed by: NURSE PRACTITIONER

## 2024-11-25 PROCEDURE — 1123F ACP DISCUSS/DSCN MKR DOCD: CPT | Performed by: NURSE PRACTITIONER

## 2024-11-25 PROCEDURE — 3008F BODY MASS INDEX DOCD: CPT | Performed by: NURSE PRACTITIONER

## 2024-11-25 PROCEDURE — 99213 OFFICE O/P EST LOW 20 MIN: CPT | Performed by: NURSE PRACTITIONER

## 2024-11-25 PROCEDURE — 1036F TOBACCO NON-USER: CPT | Performed by: NURSE PRACTITIONER

## 2024-11-25 PROCEDURE — 1159F MED LIST DOCD IN RCRD: CPT | Performed by: NURSE PRACTITIONER

## 2024-11-25 RX ORDER — NITROFURANTOIN 25; 75 MG/1; MG/1
100 CAPSULE ORAL 2 TIMES DAILY
Qty: 10 CAPSULE | Refills: 0 | Status: SHIPPED | OUTPATIENT
Start: 2024-11-25 | End: 2024-11-30

## 2024-11-25 NOTE — PATIENT INSTRUCTIONS
You are being treated for UTI. We are sending out a culture which will specifically tell us which antibiotic will work best against the bacteria in your urine.

## 2024-11-25 NOTE — PROGRESS NOTES
Chief Complaint   Patient presents with    UTI     Urine frequency and elevated blood pressure x 3 days.   C/o urinary frequency, back pain, and difficulty urinating     Physical Exam:     No CVA tenderness bilaterally     POC Labs:     Office Visit on 11/25/2024   Component Date Value Ref Range Status    POC Color, Urine 11/25/2024 Straw  Straw, Yellow, Light-Yellow Final    POC Appearance, Urine 11/25/2024 Hazy (A)  Clear Final    POC Glucose, Urine 11/25/2024 NEGATIVE  NEGATIVE mg/dl Final    POC Bilirubin, Urine 11/25/2024 NEGATIVE  NEGATIVE Final    POC Ketones, Urine 11/25/2024 NEGATIVE  NEGATIVE mg/dl Final    POC Specific Gravity, Urine 11/25/2024 1.025  1.005 - 1.035 Final    POC Blood, Urine 11/25/2024 TRACE-Intact (A)  NEGATIVE Final    POC PH, Urine 11/25/2024 5.5  No Reference Range Established PH Final    POC Protein, Urine 11/25/2024 NEGATIVE  NEGATIVE, 30 (1+) mg/dl Final    POC Urobilinogen, Urine 11/25/2024 0.2  0.2, 1.0 EU/DL Final    Poc Nitrite, Urine 11/25/2024 NEGATIVE  NEGATIVE Final    POC Leukocytes, Urine 11/25/2024 TRACE (A)  NEGATIVE Final        Encounter Diagnosis   Name Primary?    Dysuria Yes        Medical Decision Making & Plan:     Dysuria  -UA with trace leuks, not overtly convincing of UTI however she does endorse dysuria which just started. Will start macrobid and send culture.  Return to clinic if no improvement or worsening of symptoms after 7-10 days  -instructed to go to ED should she develop fever or flank pain       11/25/24 at 1:20 PM - TAMMY Reyes-CNP

## 2024-11-26 ENCOUNTER — TELEPHONE (OUTPATIENT)
Dept: PRIMARY CARE | Facility: CLINIC | Age: 69
End: 2024-11-26
Payer: MEDICARE

## 2025-01-15 ENCOUNTER — APPOINTMENT (OUTPATIENT)
Dept: PRIMARY CARE | Facility: CLINIC | Age: 70
End: 2025-01-15
Payer: MEDICARE

## 2025-01-15 VITALS
OXYGEN SATURATION: 98 % | BODY MASS INDEX: 26.39 KG/M2 | HEART RATE: 63 BPM | DIASTOLIC BLOOD PRESSURE: 72 MMHG | WEIGHT: 149 LBS | SYSTOLIC BLOOD PRESSURE: 146 MMHG

## 2025-01-15 DIAGNOSIS — R03.0 ELEVATED BLOOD PRESSURE READING: Primary | ICD-10-CM

## 2025-01-15 DIAGNOSIS — M70.31 BURSITIS OF RIGHT ELBOW, UNSPECIFIED BURSA: ICD-10-CM

## 2025-01-15 DIAGNOSIS — E78.5 DYSLIPIDEMIA: ICD-10-CM

## 2025-01-15 PROCEDURE — 1123F ACP DISCUSS/DSCN MKR DOCD: CPT | Performed by: INTERNAL MEDICINE

## 2025-01-15 PROCEDURE — 99213 OFFICE O/P EST LOW 20 MIN: CPT | Performed by: INTERNAL MEDICINE

## 2025-01-15 PROCEDURE — 1036F TOBACCO NON-USER: CPT | Performed by: INTERNAL MEDICINE

## 2025-01-15 PROCEDURE — 1160F RVW MEDS BY RX/DR IN RCRD: CPT | Performed by: INTERNAL MEDICINE

## 2025-01-15 PROCEDURE — G2211 COMPLEX E/M VISIT ADD ON: HCPCS | Performed by: INTERNAL MEDICINE

## 2025-01-15 PROCEDURE — 1159F MED LIST DOCD IN RCRD: CPT | Performed by: INTERNAL MEDICINE

## 2025-01-15 RX ORDER — ROSUVASTATIN CALCIUM 5 MG/1
TABLET, COATED ORAL
Qty: 72 TABLET | Refills: 1 | Status: SHIPPED | OUTPATIENT
Start: 2025-01-15

## 2025-01-15 ASSESSMENT — ENCOUNTER SYMPTOMS
OCCASIONAL FEELINGS OF UNSTEADINESS: 0
PALPITATIONS: 0
LOSS OF SENSATION IN FEET: 0
DEPRESSION: 0
HEADACHES: 0

## 2025-01-15 NOTE — PATIENT INSTRUCTIONS
Maintain low salt diet, keep regular exercise, limit amount of caffeine intake, apply ice and voltaren ( diclofenac cream otc ) avoid heavy lifting on that side of arm

## 2025-01-15 NOTE — PROGRESS NOTES
Subjective   Patient ID: Janis Bailey is a 69 y.o. female who presents for bp check    She brings bp readings after 5 min resting, sitting and systolics 140 to 118 , diastolics 66 to 82. She craves salt, intake is moderate, She was off exercise for 2 weeks, she regularly exercise between 3 to 4 x week with weight/ cardio.  She started magnesium in November and leg cramps resolved.  2 days ago she started feeling very tender elbow, this am slight redness, unable to bend fully. She has been using more that side due to prior left arm /neck pain that now resolved    Review of Systems   Cardiovascular:  Negative for palpitations and leg swelling.   Neurological:  Negative for headaches.   All other systems reviewed and are negative.      Objective   /72   Pulse 63   Wt 67.6 kg (149 lb)   LMP  (LMP Unknown)   SpO2 98%   BMI 26.39 kg/m²     Physical Exam  Eyes- Conjunctiva clear  Neck-no thyromegaly  Cardiac- regular rate and rhythm, no murmurs  Lung- clear to auscultation  GI- present  bowel sounds, nontender, no rebound  MSK- no deformities,right swollen elbow about 1 quarter size, with tenderness and erythema, able to maintain rom, excep extreme extension  Extremities- no edema, good distal pulses  Neuro- non focal, oriented x 3  Psychiatric- pleasant, well groomed, no hallucinations    Assessment/Plan   Problem List Items Addressed This Visit             ICD-10-CM    Dyslipidemia E78.5    Relevant Medications    rosuvastatin (Crestor) 5 mg tablet    Elevated blood pressure reading - Primary R03.0     No need for med, discussed non medical intervention , re eval in next visit.         Bursitis of right elbow M70.31     Topical treatment, F/U PRN

## 2025-01-21 ENCOUNTER — APPOINTMENT (OUTPATIENT)
Dept: PRIMARY CARE | Facility: CLINIC | Age: 70
End: 2025-01-21
Payer: MEDICARE

## 2025-02-19 ENCOUNTER — APPOINTMENT (OUTPATIENT)
Dept: PRIMARY CARE | Facility: CLINIC | Age: 70
End: 2025-02-19
Payer: MEDICARE

## 2025-03-26 DIAGNOSIS — E11.9 DIABETES MELLITUS WITHOUT COMPLICATION: ICD-10-CM

## 2025-03-27 RX ORDER — METFORMIN HYDROCHLORIDE 500 MG/1
500 TABLET, EXTENDED RELEASE ORAL
Qty: 180 TABLET | Refills: 0 | Status: SHIPPED | OUTPATIENT
Start: 2025-03-27

## 2025-04-07 ENCOUNTER — APPOINTMENT (OUTPATIENT)
Dept: ORTHOPEDIC SURGERY | Facility: CLINIC | Age: 70
End: 2025-04-07
Payer: MEDICARE

## 2025-04-07 ENCOUNTER — HOSPITAL ENCOUNTER (OUTPATIENT)
Dept: RADIOLOGY | Facility: CLINIC | Age: 70
Discharge: HOME | End: 2025-04-07
Payer: MEDICARE

## 2025-04-07 DIAGNOSIS — M65.342 TRIGGER FINGER, LEFT RING FINGER: Primary | ICD-10-CM

## 2025-04-07 DIAGNOSIS — M19.042 PRIMARY OSTEOARTHRITIS OF LEFT HAND: ICD-10-CM

## 2025-04-07 DIAGNOSIS — M79.642 LEFT HAND PAIN: ICD-10-CM

## 2025-04-07 PROCEDURE — 76942 ECHO GUIDE FOR BIOPSY: CPT | Performed by: ORTHOPAEDIC SURGERY

## 2025-04-07 PROCEDURE — 1159F MED LIST DOCD IN RCRD: CPT | Performed by: ORTHOPAEDIC SURGERY

## 2025-04-07 PROCEDURE — 1160F RVW MEDS BY RX/DR IN RCRD: CPT | Performed by: ORTHOPAEDIC SURGERY

## 2025-04-07 PROCEDURE — 99203 OFFICE O/P NEW LOW 30 MIN: CPT | Performed by: ORTHOPAEDIC SURGERY

## 2025-04-07 PROCEDURE — 1123F ACP DISCUSS/DSCN MKR DOCD: CPT | Performed by: ORTHOPAEDIC SURGERY

## 2025-04-07 PROCEDURE — 1125F AMNT PAIN NOTED PAIN PRSNT: CPT | Performed by: ORTHOPAEDIC SURGERY

## 2025-04-07 PROCEDURE — 1036F TOBACCO NON-USER: CPT | Performed by: ORTHOPAEDIC SURGERY

## 2025-04-07 PROCEDURE — 73130 X-RAY EXAM OF HAND: CPT | Mod: LEFT SIDE | Performed by: RADIOLOGY

## 2025-04-07 PROCEDURE — 73130 X-RAY EXAM OF HAND: CPT | Mod: LT

## 2025-04-07 PROCEDURE — 20550 NJX 1 TENDON SHEATH/LIGAMENT: CPT | Performed by: ORTHOPAEDIC SURGERY

## 2025-04-07 ASSESSMENT — ENCOUNTER SYMPTOMS
CHILLS: 0
JOINT SWELLING: 1
SHORTNESS OF BREATH: 0
COLOR CHANGE: 0
WHEEZING: 0
FATIGUE: 0
ARTHRALGIAS: 1
SINUS PAIN: 0
FEVER: 0
SORE THROAT: 0

## 2025-04-07 ASSESSMENT — PAIN SCALES - GENERAL: PAINLEVEL_OUTOF10: 8

## 2025-04-07 ASSESSMENT — PAIN - FUNCTIONAL ASSESSMENT: PAIN_FUNCTIONAL_ASSESSMENT: 0-10

## 2025-04-07 NOTE — PROGRESS NOTES
Reason for Appointment  Chief Complaint   Patient presents with    Left Hand - Pain     History of Present Illness  New patient is a 70 y.o. female here today for evaluation of her left ring finger.  For the last few months she has had increased pain and catching and locking of the left ring finger.  She has swelling over the left ring A1 pulley and a possible ganglion cyst.  X-rays taken today of left hand shows severe DJD throughout the hand but most severe at the left ring finger MP and PIP joints in the left thumb CMC joint.  She is still very active.     Past Medical History:   Diagnosis Date    Abdominal distension (gaseous) 07/19/2021    Bloated abdomen    Abnormal findings on diagnostic imaging of other specified body structures 03/21/2018    Increased endometrial stripe thickness    Age-related osteoporosis without current pathological fracture 04/13/2022    Age related osteoporosis    Anesthesia of skin 04/12/2016    Numbness of face    Chronic cough 08/10/2022    Post-COVID chronic cough    Encounter for gynecological examination (general) (routine) without abnormal findings     Encounter for gynecological examination without abnormal finding    Encounter for screening mammogram for malignant neoplasm of breast 05/31/2013    Visit for screening mammogram    Lateral epicondylitis, right elbow 06/16/2016    Lateral epicondylitis of both elbows    Low back pain, unspecified 05/18/2018    Acute low back pain due to trauma    Lumbar spondylosis with myelopathy 09/18/2023    Metabolic syndrome 09/20/2017    Insulin resistance syndrome    Other specified disorders of ear, unspecified ear 03/17/2015    Pressure sensation in ear    Other spondylosis with myelopathy, lumbar region 05/25/2021    Lumbar spondylosis with myelopathy    Pain in right ankle and joints of right foot 05/02/2017    Toe joint pain, right    Pain in unspecified hand 12/13/2013    Pain in hand    Paresthesia of skin 04/12/2016    Left leg  paresthesias    Paresthesia of skin 2016    Tingling    Personal history of other diseases of the digestive system 10/22/2015    History of esophageal reflux    Personal history of other diseases of the female genital tract 2020    History of vaginitis    Personal history of other diseases of the musculoskeletal system and connective tissue 2020    History of muscle pain    Personal history of other diseases of the musculoskeletal system and connective tissue 2019    History of low back pain    Personal history of other diseases of the nervous system and sense organs 2013    History of carpal tunnel syndrome    Personal history of other diseases of the respiratory system 2016    History of upper respiratory infection    Personal history of other diseases of the respiratory system 2015    History of acute sinusitis    Personal history of other diseases of the respiratory system 2013    History of acute bronchitis    Personal history of other specified conditions 2015    History of shortness of breath    Personal history of other specified conditions 2016    History of dizziness    Personal history of other specified conditions 2015    History of chest pain    Personal history of urinary (tract) infections 2020    History of urinary tract infection    Prediabetes 2015    Prediabetes    Right lower quadrant pain 2021    Abdominal wall pain in right lower quadrant    Right lower quadrant pain 2017    Right lower quadrant abdominal pain of unknown etiology    Right upper quadrant pain 2018    Right upper quadrant abdominal pain    Solitary pulmonary nodule 2021    Lung nodule    Syncope and collapse 2015    Pre-syncope    Urinary tract infection, site not specified 2021    Acute UTI       Past Surgical History:   Procedure Laterality Date     SECTION, CLASSIC  2013     Section     COLONOSCOPY  05/31/2013    Complete Colonoscopy    MR HEAD ANGIO WO IV CONTRAST  4/21/2016    MR HEAD ANGIO WO IV CONTRAST 4/21/2016 [facility] [place] [facility]       Medication Documentation Review Audit       Reviewed by Ana Esquivel MD (Physician) on 01/15/25 at 1251      Medication Order Taking? Sig Documenting Provider Last Dose Status   BIOTIN ORAL [id number] No Take by mouth. Historical MD Humberto Taking Active   cholecalciferol (Vitamin D-3) 50 MCG (2000 UT) tablet [id number] No Vitamin D 50 MCG (2000 UT) Oral Tablet   Refills: 0        Start : 16-Oct-2019   Active Historical MD Humberto Taking Active   cyanocobalamin (Vitamin B-12) 1,000 mcg tablet [id number] No Take 2 tablets (2,000 mcg) by mouth once daily. Historical MD Humberto Taking Active   famotidine (Pepcid) 20 mg tablet [id number]  TAKE ONE TABLET BY MOUTH EVERY MORNING 30 MINUTES PRIOR TO BREAKFAST Ana Esquivel MD  Active     Discontinued 01/15/25 1244   fexofenadine HCl (ALLEGRA ORAL) [id number] No Allegra CAPS   Refills: 0       Active Historical MD Humberto Taking Active   metFORMIN  mg 24 hr tablet [id number]  TAKE 1 TABLET 2 TIMES DAILYWITH FOOD Ana Esquivel MD  Active     Discontinued 01/15/25 1244   rosuvastatin (Crestor) 5 mg tablet [id number]  Take 1 tablet 5 days a week Ana Esquivel MD  Active                    No Known Allergies    Review of Systems   Constitutional:  Negative for chills, fatigue and fever.   HENT:  Negative for nosebleeds, sinus pain and sore throat.    Respiratory:  Negative for shortness of breath and wheezing.    Cardiovascular:  Negative for chest pain and leg swelling.   Musculoskeletal:  Positive for arthralgias and joint swelling.   Skin:  Negative for color change and pallor.     Exam   On exam patient is alert, awake, and in no acute distress.  Head is normocephalic, no JVD, no auditory wheezes.  She has good shoulder and elbow motion.  DJD in the hands especially at the left  thumb CMC joint with hyperextension of the left thumb MP joint.  She has some swelling and tenderness over the left ring A1 pulley tendon sheath, likely ganglion cyst over the A1 pulley region with crepitation with flexion of the left ring finger.  Decent digital motion otherwise with no other triggering.  Skin is warm and dry, without ulcerations, no other swelling or lymphadenopathy.  Good pulses and sensation in the upper extremity.    Assessment   Left ring trigger finger  Left hand osteoarthritis    Plan   We discussed conservative treatment today with a simple injection to calm down her symptoms.  She does have significant arthritis in the hands but this is not significantly bothering her today.  We did discuss possible surgical release of her trigger finger in the future though if symptoms return or worsen.  We sterilely injected under ultrasound guidance Depo-Medrol lidocaine into the left ring finger A1 pulley tendon sheath.  She can follow-up with us as needed.  Patient ID: Janis Bailey is a 70 y.o. female.    Hand / UE Inj/Asp: L ring A1 for trigger finger on 4/9/2025 6:53 AM  Indications: pain  Details: 25 G needle, ultrasound-guided  Medications: 1 mL lidocaine 10 mg/mL (1 %); 20 mg methylPREDNISolone acetate 40 mg/mL  Outcome: tolerated well, no immediate complications    After discussing the risks and benefits of the procedure we proceeded with the injection. Under ultrasound guidance we identified the metacarpal bone and overlying tendon sheath with the FDS and FDP tendons, images obtained and saved. We then sterilely injected the left ring finger A1 pulley with a mixture of 20 mg of DepoMedrol and .5 cc of 1% lidocaine. Pt tolerated the procedure well without any adverse reactions.    Procedure, treatment alternatives, risks and benefits explained, specific risks discussed. Consent was given by the patient. Immediately prior to procedure a time out was called to verify the correct patient,  procedure, equipment, support staff and site/side marked as required. Patient was prepped and draped in the usual sterile fashion.         I, Dunia Clemente PA-C, am acting as a scribe and attest that this documentation has been prepared under the direction and in the presence of Renato Davis MD.    By signing below, I, Renato Davis MD, personally performed the services described in this documentation. All medical record entries made by the scribe were at my direction and in my presence. I have reviewed the chart and agree that the record reflects my personal performance and is accurate and complete.

## 2025-04-09 RX ORDER — METHYLPREDNISOLONE ACETATE 40 MG/ML
20 INJECTION, SUSPENSION INTRA-ARTICULAR; INTRALESIONAL; INTRAMUSCULAR; SOFT TISSUE
Status: COMPLETED | OUTPATIENT
Start: 2025-04-09 | End: 2025-04-09

## 2025-04-09 RX ORDER — LIDOCAINE HYDROCHLORIDE 10 MG/ML
1 INJECTION, SOLUTION INFILTRATION; PERINEURAL
Status: COMPLETED | OUTPATIENT
Start: 2025-04-09 | End: 2025-04-09

## 2025-04-09 RX ADMIN — METHYLPREDNISOLONE ACETATE 20 MG: 40 INJECTION, SUSPENSION INTRA-ARTICULAR; INTRALESIONAL; INTRAMUSCULAR; SOFT TISSUE at 06:53

## 2025-04-09 RX ADMIN — LIDOCAINE HYDROCHLORIDE 1 ML: 10 INJECTION, SOLUTION INFILTRATION; PERINEURAL at 06:53

## 2025-04-13 ENCOUNTER — OFFICE VISIT (OUTPATIENT)
Dept: URGENT CARE | Age: 70
End: 2025-04-13
Payer: MEDICARE

## 2025-04-13 VITALS
BODY MASS INDEX: 26.75 KG/M2 | RESPIRATION RATE: 18 BRPM | WEIGHT: 151 LBS | OXYGEN SATURATION: 96 % | TEMPERATURE: 97.5 F | HEART RATE: 68 BPM | HEIGHT: 63 IN | SYSTOLIC BLOOD PRESSURE: 159 MMHG | DIASTOLIC BLOOD PRESSURE: 70 MMHG

## 2025-04-13 DIAGNOSIS — S81.852A DOG BITE OF LEFT LOWER LEG, INITIAL ENCOUNTER: Primary | ICD-10-CM
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
## 2025-04-13 PROCEDURE — 1159F MED LIST DOCD IN RCRD: CPT | Performed by: SURGERY

## 2025-04-13 PROCEDURE — 1160F RVW MEDS BY RX/DR IN RCRD: CPT | Performed by: SURGERY

## 2025-04-13 PROCEDURE — 1123F ACP DISCUSS/DSCN MKR DOCD: CPT | Performed by: SURGERY

## 2025-04-13 PROCEDURE — 99213 OFFICE O/P EST LOW 20 MIN: CPT | Performed by: SURGERY

## 2025-04-13 PROCEDURE — 1036F TOBACCO NON-USER: CPT | Performed by: SURGERY

## 2025-04-13 PROCEDURE — 3008F BODY MASS INDEX DOCD: CPT | Performed by: SURGERY

## 2025-04-13 RX ORDER — DOXYCYCLINE 100 MG/1
100 CAPSULE ORAL 2 TIMES DAILY
Qty: 10 CAPSULE | Refills: 0 | Status: SHIPPED | OUTPATIENT
Start: 2025-04-13 | End: 2025-04-18

## 2025-04-13 NOTE — PROGRESS NOTES
Chief Complaint   Patient presents with    Animal Bite     Dog bite on the left calf - redness around the bite along with scabbing, happened 03/30/25.         Physical Exam:     On the lateral left calf there are 3 puncture wounds with scab and surrounding erythema without induration        Encounter Diagnosis   Name Primary?    Dog bite of left lower leg, initial encounter Yes        Medical Decision Making & Plan:   Doxycycline    Home        04/13/25 at 6:07 PM - Sun Chandler, DO

## 2025-04-17 DIAGNOSIS — K21.9 GASTROESOPHAGEAL REFLUX DISEASE WITHOUT ESOPHAGITIS: ICD-10-CM

## 2025-04-18 RX ORDER — FAMOTIDINE 20 MG/1
TABLET, FILM COATED ORAL
Qty: 90 TABLET | Refills: 1 | Status: SHIPPED | OUTPATIENT
Start: 2025-04-18

## 2025-04-29 ENCOUNTER — APPOINTMENT (OUTPATIENT)
Dept: PRIMARY CARE | Facility: CLINIC | Age: 70
End: 2025-04-29
Payer: MEDICARE

## 2025-04-29 VITALS
HEIGHT: 61 IN | RESPIRATION RATE: 16 BRPM | OXYGEN SATURATION: 99 % | SYSTOLIC BLOOD PRESSURE: 130 MMHG | WEIGHT: 153.22 LBS | DIASTOLIC BLOOD PRESSURE: 80 MMHG | HEART RATE: 76 BPM | BODY MASS INDEX: 28.93 KG/M2

## 2025-04-29 DIAGNOSIS — G89.29 CHRONIC LEFT SHOULDER PAIN: ICD-10-CM

## 2025-04-29 DIAGNOSIS — E78.5 DYSLIPIDEMIA: ICD-10-CM

## 2025-04-29 DIAGNOSIS — Z12.31 SCREENING MAMMOGRAM FOR BREAST CANCER: ICD-10-CM

## 2025-04-29 DIAGNOSIS — K21.9 GASTROESOPHAGEAL REFLUX DISEASE WITHOUT ESOPHAGITIS: ICD-10-CM

## 2025-04-29 DIAGNOSIS — R25.2 LEG CRAMP: ICD-10-CM

## 2025-04-29 DIAGNOSIS — E55.9 VITAMIN D DEFICIENCY, UNSPECIFIED: ICD-10-CM

## 2025-04-29 DIAGNOSIS — Z86.39 HISTORY OF HYPERGLYCEMIA: ICD-10-CM

## 2025-04-29 DIAGNOSIS — M25.512 CHRONIC LEFT SHOULDER PAIN: ICD-10-CM

## 2025-04-29 DIAGNOSIS — Z00.00 WELLNESS EXAMINATION: Primary | ICD-10-CM

## 2025-04-29 DIAGNOSIS — R73.9 BORDERLINE HYPERGLYCEMIA: ICD-10-CM

## 2025-04-29 PROBLEM — K25.9 GASTRIC ULCER WITHOUT HEMORRHAGE OR PERFORATION: Status: RESOLVED | Noted: 2023-09-18 | Resolved: 2025-04-29

## 2025-04-29 PROCEDURE — 1036F TOBACCO NON-USER: CPT | Performed by: INTERNAL MEDICINE

## 2025-04-29 PROCEDURE — G0439 PPPS, SUBSEQ VISIT: HCPCS | Performed by: INTERNAL MEDICINE

## 2025-04-29 PROCEDURE — 1159F MED LIST DOCD IN RCRD: CPT | Performed by: INTERNAL MEDICINE

## 2025-04-29 PROCEDURE — 1160F RVW MEDS BY RX/DR IN RCRD: CPT | Performed by: INTERNAL MEDICINE

## 2025-04-29 PROCEDURE — 1170F FXNL STATUS ASSESSED: CPT | Performed by: INTERNAL MEDICINE

## 2025-04-29 PROCEDURE — 99214 OFFICE O/P EST MOD 30 MIN: CPT | Performed by: INTERNAL MEDICINE

## 2025-04-29 PROCEDURE — 1123F ACP DISCUSS/DSCN MKR DOCD: CPT | Performed by: INTERNAL MEDICINE

## 2025-04-29 PROCEDURE — 3008F BODY MASS INDEX DOCD: CPT | Performed by: INTERNAL MEDICINE

## 2025-04-29 ASSESSMENT — PATIENT HEALTH QUESTIONNAIRE - PHQ9
1. LITTLE INTEREST OR PLEASURE IN DOING THINGS: NOT AT ALL
SUM OF ALL RESPONSES TO PHQ9 QUESTIONS 1 AND 2: 0
2. FEELING DOWN, DEPRESSED OR HOPELESS: NOT AT ALL

## 2025-04-29 ASSESSMENT — ACTIVITIES OF DAILY LIVING (ADL)
DRESSING: INDEPENDENT
GROCERY_SHOPPING: INDEPENDENT
TAKING_MEDICATION: INDEPENDENT
DOING_HOUSEWORK: INDEPENDENT
MANAGING_FINANCES: INDEPENDENT
BATHING: INDEPENDENT

## 2025-04-29 ASSESSMENT — COLUMBIA-SUICIDE SEVERITY RATING SCALE - C-SSRS
6. HAVE YOU EVER DONE ANYTHING, STARTED TO DO ANYTHING, OR PREPARED TO DO ANYTHING TO END YOUR LIFE?: NO
2. HAVE YOU ACTUALLY HAD ANY THOUGHTS OF KILLING YOURSELF?: NO
1. IN THE PAST MONTH, HAVE YOU WISHED YOU WERE DEAD OR WISHED YOU COULD GO TO SLEEP AND NOT WAKE UP?: NO

## 2025-04-29 ASSESSMENT — ENCOUNTER SYMPTOMS
HEADACHES: 0
SHORTNESS OF BREATH: 0
MYALGIAS: 0
BLOOD IN STOOL: 0
OCCASIONAL FEELINGS OF UNSTEADINESS: 0
DEPRESSION: 0
LOSS OF SENSATION IN FEET: 0

## 2025-04-29 NOTE — PROGRESS NOTES
"Subjective   Reason for Visit: Janis Bailey is an 70 y.o. female here for a Medicare Wellness visit.     Past Medical, Surgical, and Family History reviewed and updated in chart.    Reviewed all medications by prescribing practitioner or clinical pharmacist (such as prescriptions, OTCs, herbal therapies and supplements) and documented in the medical record.    HPI Diet has been similar 2 to 3 meals /day. Breakfast cereal or yogurt or waffles or egg and toast, leone on weekends, Lunch sandwich or salad left overs. Dinner home cooking 6 x week, protein, starch, vegetable or salad about 3 x week. Snack with chips, chocolate nuts , popcorn. ETOH one/week  Sweets daily  She continues to [employer] 3 to 4 x week. Cardio, weights, and water fitness. Each 45 min.  She had couple episodes of heartburn very intense  Uses ibuprofen less than once a week or topical voltaren for her knee ache.   Left shoulder feels spasm , ocassionally aches. She sleeps on left side. Also stiffness and aches on cervical area that currently resolved.  Continues 5 x week rosuvastatin.    Patient Care Team:  Ana Esquivel MD as PCP - General  Ana Esquivel MD as PCP - [employer] PCP     Review of Systems   Respiratory:  Negative for shortness of breath.    Cardiovascular:  Negative for chest pain and leg swelling.   Gastrointestinal:  Negative for blood in stool.   Musculoskeletal:  Negative for myalgias.   Neurological:  Negative for headaches.   All other systems reviewed and are negative.      Objective   Vitals:  /80   Pulse 76   Resp 16   Ht 1.549 m (5' 1\")   Wt 69.5 kg (153 lb 3.5 oz)   LMP  (LMP Unknown)   SpO2 99%   BMI 28.95 kg/m²       Physical Exam  Eyes - conjunctiva clear, PERRLA  HEENT - no impacted wax  Neck - No cervical lymphadenopathy, no thyromegaly  Axilla - no palpable lymphadenopathy  Breast - visual exam: no asymmetry; palpation: nontender, no palpable nodules, retractions or " discharge bilaterally  Cardiac - regular rate and rhythm, no murmurs, no carotid bruit, no JVP  Lung- clear to auscultation, no rales, no rhonchi, no wheezing  GI- normally active bowel sounds, nontender, nondistended, no hepatosplenomegaly, no rebound  MSK - no deformities, cracking on left shoulder to ROM , mild tenderness on anterior aspect.   Neuro - non focal, oriented x 3  Extremities - no edema, good distal arterial pulses  Skin - no rash  Psychiatric - pleasant, cooperative, no hallucinations    Assessment & Plan  Screening mammogram for breast cancer    Orders:    BI mammo bilateral screening tomosynthesis; Future    Wellness examination  Discussed diet, exercise, immunizations, and screening appropriate for their age.  Colonoscopy: sep 2021 next in 2026  Dexa: 2022 , mild osteopenia  Mammogram: oct 2024           Dyslipidemia  Lipid pending    Orders:    Comprehensive Metabolic Panel; Future    Lipid Panel; Future    Gastroesophageal reflux disease without esophagitis  Discussed diet modification, keep dairy for gerd symptoms and frequency, may need to alternate famotidine with pantoprazole.         Borderline hyperglycemia  Stable continue metformin    Orders:    CBC; Future    Hemoglobin A1C; Future    TSH with reflex to Free T4 if abnormal; Future    Leg cramp  Improved with magnesium    Orders:    Magnesium; Future    Vitamin D deficiency, unspecified    Orders:    Vitamin D 25-Hydroxy,Total (for eval of Vitamin D levels); Future    History of hyperglycemia    Orders:    TSH with reflex to Free T4 if abnormal; Future    Chronic left shoulder pain  Advised modification on exercise program, further eval if persist

## 2025-04-29 NOTE — ASSESSMENT & PLAN NOTE
Discussed diet modification, keep dairy for gerd symptoms and frequency, may need to alternate famotidine with pantoprazole.

## 2025-04-29 NOTE — PATIENT INSTRUCTIONS
Consent for LP signed and placed in pt chart.   Increase amount of vegetables to daily, decrease nuts, chocolate to help hearburtn,contact office if severe heartburn continues, avoid repetitive exercise in same joints, at local pharmacy complete tdap ( tetanus/whooping cough). Monitor weekly your blood pressure and bring to us next time

## 2025-04-29 NOTE — ASSESSMENT & PLAN NOTE
Stable continue metformin    Orders:    CBC; Future    Hemoglobin A1C; Future    TSH with reflex to Free T4 if abnormal; Future

## 2025-04-29 NOTE — ASSESSMENT & PLAN NOTE
Discussed diet, exercise, immunizations, and screening appropriate for their age.  Colonoscopy: sep 2021 next in 2026  Dexa: 2022 , mild osteopenia  Mammogram: oct 2024

## 2025-04-30 LAB
25(OH)D3+25(OH)D2 SERPL-MCNC: 84 NG/ML (ref 30–100)
ALBUMIN SERPL-MCNC: 4.5 G/DL (ref 3.6–5.1)
ALP SERPL-CCNC: 77 U/L (ref 37–153)
ALT SERPL-CCNC: 29 U/L (ref 6–29)
ANION GAP SERPL CALCULATED.4IONS-SCNC: 9 MMOL/L (CALC) (ref 7–17)
AST SERPL-CCNC: 30 U/L (ref 10–35)
BILIRUB SERPL-MCNC: 0.6 MG/DL (ref 0.2–1.2)
BUN SERPL-MCNC: 17 MG/DL (ref 7–25)
CALCIUM SERPL-MCNC: 9.5 MG/DL (ref 8.6–10.4)
CHLORIDE SERPL-SCNC: 102 MMOL/L (ref 98–110)
CHOLEST SERPL-MCNC: 194 MG/DL
CHOLEST/HDLC SERPL: 2.2 (CALC)
CO2 SERPL-SCNC: 27 MMOL/L (ref 20–32)
CREAT SERPL-MCNC: 0.82 MG/DL (ref 0.6–1)
EGFRCR SERPLBLD CKD-EPI 2021: 76 ML/MIN/1.73M2
ERYTHROCYTE [DISTWIDTH] IN BLOOD BY AUTOMATED COUNT: 12.9 % (ref 11–15)
EST. AVERAGE GLUCOSE BLD GHB EST-MCNC: 131 MG/DL
EST. AVERAGE GLUCOSE BLD GHB EST-SCNC: 7.3 MMOL/L
GLUCOSE SERPL-MCNC: 116 MG/DL (ref 65–99)
HBA1C MFR BLD: 6.2 %
HCT VFR BLD AUTO: 39.9 % (ref 35–45)
HDLC SERPL-MCNC: 89 MG/DL
HGB BLD-MCNC: 13.5 G/DL (ref 11.7–15.5)
LDLC SERPL CALC-MCNC: 91 MG/DL (CALC)
MAGNESIUM SERPL-MCNC: 2.1 MG/DL (ref 1.5–2.5)
MCH RBC QN AUTO: 31.8 PG (ref 27–33)
MCHC RBC AUTO-ENTMCNC: 33.8 G/DL (ref 32–36)
MCV RBC AUTO: 93.9 FL (ref 80–100)
NONHDLC SERPL-MCNC: 105 MG/DL (CALC)
PLATELET # BLD AUTO: 323 THOUSAND/UL (ref 140–400)
PMV BLD REES-ECKER: 9.5 FL (ref 7.5–12.5)
POTASSIUM SERPL-SCNC: 4.5 MMOL/L (ref 3.5–5.3)
PROT SERPL-MCNC: 7.3 G/DL (ref 6.1–8.1)
RBC # BLD AUTO: 4.25 MILLION/UL (ref 3.8–5.1)
SODIUM SERPL-SCNC: 138 MMOL/L (ref 135–146)
TRIGL SERPL-MCNC: 62 MG/DL
TSH SERPL-ACNC: 2.39 MIU/L (ref 0.4–4.5)
WBC # BLD AUTO: 4.5 THOUSAND/UL (ref 3.8–10.8)

## 2025-05-19 ENCOUNTER — TELEPHONE (OUTPATIENT)
Dept: PRIMARY CARE | Facility: CLINIC | Age: 70
End: 2025-05-19
Payer: MEDICARE

## 2025-06-20 DIAGNOSIS — E11.9 DIABETES MELLITUS WITHOUT COMPLICATION: ICD-10-CM

## 2025-06-20 RX ORDER — METFORMIN HYDROCHLORIDE 500 MG/1
500 TABLET, EXTENDED RELEASE ORAL
Qty: 180 TABLET | Refills: 1 | Status: SHIPPED | OUTPATIENT
Start: 2025-06-20

## 2025-08-01 DIAGNOSIS — E78.5 DYSLIPIDEMIA: ICD-10-CM

## 2025-08-01 RX ORDER — ROSUVASTATIN CALCIUM 5 MG/1
TABLET, COATED ORAL
Qty: 72 TABLET | Refills: 1 | Status: SHIPPED | OUTPATIENT
Start: 2025-08-01

## 2025-08-26 ENCOUNTER — APPOINTMENT (OUTPATIENT)
Dept: PRIMARY CARE | Facility: CLINIC | Age: 70
End: 2025-08-26
Payer: MEDICARE

## 2025-08-26 VITALS
RESPIRATION RATE: 16 BRPM | BODY MASS INDEX: 29.09 KG/M2 | DIASTOLIC BLOOD PRESSURE: 68 MMHG | WEIGHT: 154.1 LBS | SYSTOLIC BLOOD PRESSURE: 132 MMHG | OXYGEN SATURATION: 98 % | HEART RATE: 57 BPM | HEIGHT: 61 IN

## 2025-08-26 DIAGNOSIS — R03.0 ELEVATED BLOOD PRESSURE READING: Primary | ICD-10-CM

## 2025-08-26 DIAGNOSIS — R73.9 BORDERLINE HYPERGLYCEMIA: ICD-10-CM

## 2025-08-26 PROCEDURE — 1159F MED LIST DOCD IN RCRD: CPT | Performed by: INTERNAL MEDICINE

## 2025-08-26 PROCEDURE — 99213 OFFICE O/P EST LOW 20 MIN: CPT | Performed by: INTERNAL MEDICINE

## 2025-08-26 PROCEDURE — G2211 COMPLEX E/M VISIT ADD ON: HCPCS | Performed by: INTERNAL MEDICINE

## 2025-08-26 PROCEDURE — 1160F RVW MEDS BY RX/DR IN RCRD: CPT | Performed by: INTERNAL MEDICINE

## 2025-08-26 PROCEDURE — 1036F TOBACCO NON-USER: CPT | Performed by: INTERNAL MEDICINE

## 2025-08-26 PROCEDURE — 3008F BODY MASS INDEX DOCD: CPT | Performed by: INTERNAL MEDICINE

## 2025-08-26 ASSESSMENT — ENCOUNTER SYMPTOMS
CHEST TIGHTNESS: 0
DEPRESSION: 0
OCCASIONAL FEELINGS OF UNSTEADINESS: 0
LIGHT-HEADEDNESS: 0
LOSS OF SENSATION IN FEET: 0
HEADACHES: 0

## 2025-08-26 ASSESSMENT — COLUMBIA-SUICIDE SEVERITY RATING SCALE - C-SSRS
1. IN THE PAST MONTH, HAVE YOU WISHED YOU WERE DEAD OR WISHED YOU COULD GO TO SLEEP AND NOT WAKE UP?: NO
6. HAVE YOU EVER DONE ANYTHING, STARTED TO DO ANYTHING, OR PREPARED TO DO ANYTHING TO END YOUR LIFE?: NO
2. HAVE YOU ACTUALLY HAD ANY THOUGHTS OF KILLING YOURSELF?: NO

## 2025-10-10 ENCOUNTER — APPOINTMENT (OUTPATIENT)
Dept: PRIMARY CARE | Facility: CLINIC | Age: 70
End: 2025-10-10
Payer: MEDICARE

## 2026-04-14 ENCOUNTER — APPOINTMENT (OUTPATIENT)
Dept: PRIMARY CARE | Facility: CLINIC | Age: 71
End: 2026-04-14
Payer: MEDICARE